# Patient Record
Sex: MALE | Race: WHITE | NOT HISPANIC OR LATINO | Employment: FULL TIME | URBAN - METROPOLITAN AREA
[De-identification: names, ages, dates, MRNs, and addresses within clinical notes are randomized per-mention and may not be internally consistent; named-entity substitution may affect disease eponyms.]

---

## 2017-06-23 ENCOUNTER — OFFICE VISIT (OUTPATIENT)
Dept: INTERNAL MEDICINE | Facility: CLINIC | Age: 67
End: 2017-06-23
Attending: INTERNAL MEDICINE
Payer: COMMERCIAL

## 2017-06-23 VITALS
DIASTOLIC BLOOD PRESSURE: 68 MMHG | SYSTOLIC BLOOD PRESSURE: 112 MMHG | WEIGHT: 218.69 LBS | HEART RATE: 74 BPM | BODY MASS INDEX: 28.07 KG/M2 | HEIGHT: 74 IN | OXYGEN SATURATION: 97 %

## 2017-06-23 DIAGNOSIS — I48.91 ATRIAL FIBRILLATION, UNSPECIFIED TYPE: Primary | ICD-10-CM

## 2017-06-23 DIAGNOSIS — T45.1X5A CHEMOTHERAPY-INDUCED NEUROPATHY: ICD-10-CM

## 2017-06-23 DIAGNOSIS — G47.00 INSOMNIA, UNSPECIFIED TYPE: ICD-10-CM

## 2017-06-23 DIAGNOSIS — G62.0 CHEMOTHERAPY-INDUCED NEUROPATHY: ICD-10-CM

## 2017-06-23 DIAGNOSIS — Z87.898 HISTORY OF ELEVATED PSA: ICD-10-CM

## 2017-06-23 DIAGNOSIS — N40.0 BENIGN PROSTATIC HYPERPLASIA, PRESENCE OF LOWER URINARY TRACT SYMPTOMS UNSPECIFIED, UNSPECIFIED MORPHOLOGY: ICD-10-CM

## 2017-06-23 DIAGNOSIS — C34.90 MALIGNANT NEOPLASM OF LUNG, UNSPECIFIED LATERALITY, UNSPECIFIED PART OF LUNG: ICD-10-CM

## 2017-06-23 DIAGNOSIS — G47.33 OSA (OBSTRUCTIVE SLEEP APNEA): ICD-10-CM

## 2017-06-23 DIAGNOSIS — Z00.00 ANNUAL PHYSICAL EXAM: ICD-10-CM

## 2017-06-23 PROCEDURE — 90471 IMMUNIZATION ADMIN: CPT | Mod: S$GLB,,, | Performed by: INTERNAL MEDICINE

## 2017-06-23 PROCEDURE — 99387 INIT PM E/M NEW PAT 65+ YRS: CPT | Mod: 25,S$GLB,, | Performed by: INTERNAL MEDICINE

## 2017-06-23 PROCEDURE — 99999 PR PBB SHADOW E&M-NEW PATIENT-LVL III: CPT | Mod: PBBFAC,,, | Performed by: INTERNAL MEDICINE

## 2017-06-23 PROCEDURE — 90715 TDAP VACCINE 7 YRS/> IM: CPT | Mod: S$GLB,,, | Performed by: INTERNAL MEDICINE

## 2017-06-23 RX ORDER — DEXAMETHASONE 4 MG/1
4 TABLET ORAL EVERY 12 HOURS
COMMUNITY

## 2017-06-23 RX ORDER — ONDANSETRON HYDROCHLORIDE 8 MG/1
TABLET, FILM COATED ORAL EVERY 8 HOURS PRN
COMMUNITY

## 2017-06-23 RX ORDER — FAMOTIDINE 40 MG/1
40 TABLET, FILM COATED ORAL DAILY
COMMUNITY

## 2017-06-23 RX ORDER — ROSUVASTATIN CALCIUM 10 MG/1
10 TABLET, COATED ORAL DAILY
COMMUNITY
End: 2017-06-23 | Stop reason: SDUPTHER

## 2017-06-23 RX ORDER — ROSUVASTATIN CALCIUM 10 MG/1
10 TABLET, COATED ORAL DAILY
Qty: 90 TABLET | Refills: 2 | Status: SHIPPED | OUTPATIENT
Start: 2017-06-23 | End: 2017-07-10 | Stop reason: SDUPTHER

## 2017-06-23 RX ORDER — CLINDAMYCIN PHOSPHATE 11.9 MG/ML
SOLUTION TOPICAL 2 TIMES DAILY
COMMUNITY

## 2017-06-23 RX ORDER — ALPRAZOLAM 0.5 MG/1
0.5 TABLET ORAL 3 TIMES DAILY
COMMUNITY

## 2017-06-23 RX ORDER — TAMSULOSIN HYDROCHLORIDE 0.4 MG/1
0.4 CAPSULE ORAL DAILY
Qty: 90 CAPSULE | Refills: 2 | Status: SHIPPED | OUTPATIENT
Start: 2017-06-23

## 2017-06-23 RX ORDER — ESCITALOPRAM OXALATE 10 MG/1
10 TABLET ORAL DAILY
COMMUNITY

## 2017-06-23 RX ORDER — FLUTICASONE PROPIONATE 50 MCG
1 SPRAY, SUSPENSION (ML) NASAL DAILY
COMMUNITY

## 2017-06-23 RX ORDER — METOPROLOL SUCCINATE 25 MG/1
25 TABLET, EXTENDED RELEASE ORAL DAILY
COMMUNITY

## 2017-06-23 RX ORDER — SODIUM FLUORIDE 5 MG/G
GEL, DENTIFRICE DENTAL
COMMUNITY

## 2017-06-23 RX ORDER — TRAZODONE HYDROCHLORIDE 50 MG/1
50-100 TABLET ORAL NIGHTLY
Qty: 90 TABLET | Refills: 1 | Status: SHIPPED | OUTPATIENT
Start: 2017-06-23 | End: 2017-09-23 | Stop reason: SDUPTHER

## 2017-06-23 RX ORDER — TAMSULOSIN HYDROCHLORIDE 0.4 MG/1
0.4 CAPSULE ORAL DAILY
COMMUNITY
End: 2017-06-23 | Stop reason: SDUPTHER

## 2017-06-23 RX ORDER — MINERAL OIL
180 ENEMA (ML) RECTAL DAILY
COMMUNITY

## 2017-06-23 RX ORDER — PROCHLORPERAZINE MALEATE 10 MG
10 TABLET ORAL EVERY 6 HOURS PRN
COMMUNITY

## 2017-06-23 RX ORDER — DUTASTERIDE 0.5 MG/1
0.5 CAPSULE, LIQUID FILLED ORAL DAILY
Qty: 90 CAPSULE | Refills: 2 | Status: SHIPPED | OUTPATIENT
Start: 2017-06-23 | End: 2018-06-23

## 2017-06-23 RX ORDER — TRAMADOL HYDROCHLORIDE 50 MG/1
50 TABLET ORAL EVERY 6 HOURS PRN
COMMUNITY

## 2017-06-23 NOTE — PROGRESS NOTES
Subjective:       Patient ID: Merritt Tsang is a 67 y.o. male.    Chief Complaint: Establish Care    Here to establish care  Moved to [place] several months ago to be closer to son and grandchildren.      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 Currently receiving chemo at [facility] that began 03/2017. Tumor has reportedly shrunk by 50%. He is tolerating chemo well and denies frequent infection, fatigue, CP, SOB, hemoptysis, LANDERS, PND, orthopnea, palpitations. He is remaining positive. He was started on lexpro and xanax at time of diagnosis. States he is in a good place right now. He does require xanax nightly for insomnia. Has occasional leg pain and numbness. Neuropathy is not yet painful. He reports gaining weight  Lung Cancer diagnosed Nov 8th tongue cancer related to HPV s/p chemo and radiation. Spleen involvment. Started chemo in March at [facility]. Tumor has shrunk byt 50%. Leg pain and numbness, gaining weight. Ct scan i  3 weeks. Luis Armando Martinez.    BPH  -Symptoms controlled on flomax and dutasteride. He reports several elevated PSA in the past years resulting in 3 negative biopsies with last being in [place] about 4--5 months ago. Most recent PSA was <2.     Atrial fibrillation  -asymptomatic since being placed on metoprolol. Denies ever being on anticoagulation. NSR today.     ANNIA  Adherent with CPAP and tolerating well. Denies fatigue, frequent HA, PND ,LANDERS, LE edema.    Former smoker and had AAA screening last year with mild dilation.               Review of Systems   Constitutional: Negative for appetite change, chills, fever and unexpected weight change.   HENT: Negative for hearing loss, sore throat and trouble swallowing.    Eyes: Negative for visual disturbance.   Respiratory: Negative for cough, chest tightness and shortness of breath.    Cardiovascular: Negative for chest pain and leg swelling.  "  Gastrointestinal: Negative for abdominal pain, blood in stool, constipation, diarrhea, nausea and vomiting.   Endocrine: Negative for polydipsia and polyuria.   Genitourinary: Negative for decreased urine volume, difficulty urinating, dysuria, frequency and urgency.   Musculoskeletal: Negative for gait problem.   Skin: Negative for rash.   Neurological: Positive for numbness. Negative for dizziness.   Psychiatric/Behavioral: Positive for sleep disturbance. The patient is not nervous/anxious.        History reviewed. No pertinent past medical history.  History reviewed. No pertinent surgical history.  History reviewed. No pertinent family history.  Social History     Social History    Marital status:      Spouse name: N/A    Number of children: N/A    Years of education: N/A     Occupational History    Not on file.     Social History Main Topics    Smoking status: Former Smoker    Smokeless tobacco: Not on file    Alcohol use Yes    Drug use: No    Sexual activity: Yes     Other Topics Concern    Not on file     Social History Narrative    No narrative on file         Objective:      Vitals:    06/23/17 0851   BP: 112/68   Pulse: 74   SpO2: 97%   Weight: 99.2 kg (218 lb 11.1 oz)   Height: 6' 2" (1.88 m)      Physical Exam   Constitutional: He is oriented to person, place, and time. He appears well-developed and well-nourished. No distress.   HENT:   Head: Normocephalic and atraumatic.   Mouth/Throat: Oropharynx is clear and moist. No oropharyngeal exudate.   Eyes: Conjunctivae and EOM are normal. Pupils are equal, round, and reactive to light. No scleral icterus.   Neck: No thyromegaly present.   Cardiovascular: Normal rate, regular rhythm and normal heart sounds.    No murmur heard.  Pulmonary/Chest: Effort normal and breath sounds normal. He has no wheezes. He has no rales.   Abdominal: Soft. He exhibits no distension. There is no tenderness.   Musculoskeletal: He exhibits no edema or " tenderness.   Lymphadenopathy:     He has no cervical adenopathy.   Neurological: He is alert and oriented to person, place, and time.   Skin: Skin is warm and dry.   Psychiatric: He has a normal mood and affect. His behavior is normal.       Assessment:       1. Atrial fibrillation, unspecified type    2. Annual physical exam    3. Benign prostatic hyperplasia, presence of lower urinary tract symptoms unspecified, unspecified morphology    4. History of elevated PSA    5. ANNIA (obstructive sleep apnea)    6. Malignant neoplasm of lung, unspecified laterality, unspecified part of lung    7. Chemotherapy-induced neuropathy    8. Insomnia, unspecified type        Plan:       Merritt was seen today for establish care.    Diagnoses and all orders for this visit:    Atrial fibrillation, unspecified type  -     Ambulatory referral to Cardiology    Annual physical exam  -     Lipid panel; Future  -     TSH; Future  -     Hemoglobin A1c; Future  -     PSA, Screening; Future  -     Comprehensive metabolic panel; Future    Benign prostatic hyperplasia, presence of lower urinary tract symptoms unspecified, unspecified morphology  -     dutasteride (AVODART) 0.5 mg capsule; Take 1 capsule (0.5 mg total) by mouth once daily.  -     tamsulosin (FLOMAX) 0.4 mg Cp24; Take 1 capsule (0.4 mg total) by mouth once daily.    History of elevated PSA  Comments:  s/p Bx x3 in [place]    ANNIA (obstructive sleep apnea)  -no acute issues. Sleep referral prn    Malignant neoplasm of lung, unspecified laterality, unspecified part of lung  F[facility]ology [facility]    Chemotherapy-induced neuropathy  Non painful. No gait disturbance or falls. No acute issues monitor    Insomnia, unspecified type  -     START trazodone (DESYREL) 50 MG tablet; Take 1-2 tablets ( mg total) by mouth every evening.      Other orders  -     Tdap Vaccine  -     rosuvastatin (CRESTOR) 10 MG tablet; Take 1 tablet (10 mg total) by mouth once daily.          Health Maintenance and Vaccinations:  Tdap up to date: no.  PVX up to date: yes 7/15/16  Colonoscopy: Up to date. 7/12/13 highly coveted 10 year f/u  PSA Screening: Needs, risk and benefits discussed  AAA Screening: Done     Notification of Lab Results: [facility]  Side effects of medication(s) were discussed in detail and patient voiced understanding.  Patient will call back for any issues or complications.

## 2017-06-23 NOTE — PROGRESS NOTES
Patient given TDAP IM in the LD. Patient tolerated well and Band-Aid was applied. Lot#[id number] Exp:04/05/2019. Patient advised to wait in the lobby for 15 min to make sure no adverse reactions occur. Patient given VIS information sheet.Patient states verbal understanding and has no further questions.

## 2017-06-26 ENCOUNTER — LAB VISIT (OUTPATIENT)
Dept: LAB | Facility: OTHER | Age: 67
End: 2017-06-26
Attending: INTERNAL MEDICINE
Payer: COMMERCIAL

## 2017-06-26 DIAGNOSIS — Z00.00 ANNUAL PHYSICAL EXAM: ICD-10-CM

## 2017-06-26 LAB
ALBUMIN SERPL BCP-MCNC: 3.5 G/DL
ALP SERPL-CCNC: 112 U/L
ALT SERPL W/O P-5'-P-CCNC: 28 U/L
ANION GAP SERPL CALC-SCNC: 10 MMOL/L
AST SERPL-CCNC: 17 U/L
BILIRUB SERPL-MCNC: 0.5 MG/DL
BUN SERPL-MCNC: 16 MG/DL
CALCIUM SERPL-MCNC: 8.6 MG/DL
CHLORIDE SERPL-SCNC: 106 MMOL/L
CHOLEST/HDLC SERPL: 3 {RATIO}
CO2 SERPL-SCNC: 25 MMOL/L
COMPLEXED PSA SERPL-MCNC: 0.95 NG/ML
CREAT SERPL-MCNC: 0.9 MG/DL
EST. GFR  (AFRICAN AMERICAN): >60 ML/MIN/1.73 M^2
EST. GFR  (NON AFRICAN AMERICAN): >60 ML/MIN/1.73 M^2
ESTIMATED AVG GLUCOSE: 105 MG/DL
GLUCOSE SERPL-MCNC: 78 MG/DL
HBA1C MFR BLD HPLC: 5.3 %
HDL/CHOLESTEROL RATIO: 32.8 %
HDLC SERPL-MCNC: 128 MG/DL
HDLC SERPL-MCNC: 42 MG/DL
LDLC SERPL CALC-MCNC: 71.8 MG/DL
NONHDLC SERPL-MCNC: 86 MG/DL
POTASSIUM SERPL-SCNC: 3.8 MMOL/L
PROT SERPL-MCNC: 6.2 G/DL
SODIUM SERPL-SCNC: 141 MMOL/L
TRIGL SERPL-MCNC: 71 MG/DL
TSH SERPL DL<=0.005 MIU/L-ACNC: 2.33 UIU/ML

## 2017-06-26 PROCEDURE — 80053 COMPREHEN METABOLIC PANEL: CPT

## 2017-06-26 PROCEDURE — 84443 ASSAY THYROID STIM HORMONE: CPT

## 2017-06-26 PROCEDURE — 83036 HEMOGLOBIN GLYCOSYLATED A1C: CPT

## 2017-06-26 PROCEDURE — 36415 COLL VENOUS BLD VENIPUNCTURE: CPT

## 2017-06-26 PROCEDURE — 80061 LIPID PANEL: CPT

## 2017-06-26 PROCEDURE — 84153 ASSAY OF PSA TOTAL: CPT

## 2017-06-29 ENCOUNTER — OFFICE VISIT (OUTPATIENT)
Dept: CARDIOLOGY | Facility: CLINIC | Age: 67
End: 2017-06-29
Payer: COMMERCIAL

## 2017-06-29 VITALS
WEIGHT: 220.44 LBS | SYSTOLIC BLOOD PRESSURE: 121 MMHG | DIASTOLIC BLOOD PRESSURE: 75 MMHG | HEIGHT: 74 IN | HEART RATE: 79 BPM | BODY MASS INDEX: 28.29 KG/M2

## 2017-06-29 DIAGNOSIS — G47.33 OSA (OBSTRUCTIVE SLEEP APNEA): ICD-10-CM

## 2017-06-29 DIAGNOSIS — C34.90 MALIGNANT NEOPLASM OF LUNG, UNSPECIFIED LATERALITY, UNSPECIFIED PART OF LUNG: ICD-10-CM

## 2017-06-29 DIAGNOSIS — I48.0 PAROXYSMAL ATRIAL FIBRILLATION: Primary | ICD-10-CM

## 2017-06-29 PROCEDURE — 99999 PR PBB SHADOW E&M-EST. PATIENT-LVL III: CPT | Mod: PBBFAC,,, | Performed by: INTERNAL MEDICINE

## 2017-06-29 PROCEDURE — 1126F AMNT PAIN NOTED NONE PRSNT: CPT | Mod: S$GLB,,, | Performed by: INTERNAL MEDICINE

## 2017-06-29 PROCEDURE — 99204 OFFICE O/P NEW MOD 45 MIN: CPT | Mod: S$GLB,,, | Performed by: INTERNAL MEDICINE

## 2017-06-29 PROCEDURE — 1159F MED LIST DOCD IN RCRD: CPT | Mod: S$GLB,,, | Performed by: INTERNAL MEDICINE

## 2017-06-29 NOTE — PROGRESS NOTES
Subjective:   Patient ID:  Merritt Tsang is a 67 y.o. male who presents for evaluation of Atrial Fibrillation and Establish Care      HPI: Very pleasant gentleman with a history of squamous-cell carcinoma of the tongue (HPV-associated) treated with chemo and radiation that then recurred in the lung is here for assessment of his apparently rare paroxysmal atrial fibrillation.  He's never been cardioverted and was dx'd approximately 15 years ago.  Last time he recalls being in AF was over 2 years ago.  He was dx'd with ANNIA less than a year ago and feels much better with CPAP.  He's now just finished a chemo regimen for the metastatic SCC and he's tolerated well and the tumors have responded well.    He denies chest discomfort, LANDERS, palpitations, PND/orthopnea, lightheadedness and syncope.    CHADSVASC is only 1, and that's from his being 67.    Patient Active Problem List   Diagnosis    History of elevated PSA    Benign prostatic hyperplasia    Malignant neoplasm of lung    ANNIA (obstructive sleep apnea)    Atrial fibrillation    Chemotherapy-induced neuropathy         Social History   Substance Use Topics    Smoking status: Former Smoker    Smokeless tobacco: Not on file    Alcohol use Yes       History reviewed. No pertinent family history.    Current Outpatient Prescriptions   Medication Sig    alprazolam (XANAX) 0.5 MG tablet Take 0.5 mg by mouth 3 (three) times daily.    clindamycin (CLEOCIN T) 1 % external solution Apply topically 2 (two) times daily.    dexamethasone (DECADRON) 4 MG Tab Take 4 mg by mouth every 12 (twelve) hours.    dutasteride (AVODART) 0.5 mg capsule Take 1 capsule (0.5 mg total) by mouth once daily.    escitalopram oxalate (LEXAPRO) 10 MG tablet Take 10 mg by mouth once daily.    famotidine (PEPCID) 40 MG tablet Take 40 mg by mouth once daily.    fexofenadine (ALLEGRA) 180 MG tablet Take 180 mg by mouth once daily.    fluticasone (FLONASE) 50 mcg/actuation nasal spray 1  spray by Each Nare route once daily.    metoprolol succinate (TOPROL-XL) 25 MG 24 hr tablet Take 25 mg by mouth once daily.    ondansetron (ZOFRAN) 8 MG tablet Take by mouth every 8 (eight) hours as needed for Nausea.    prochlorperazine (COMPAZINE) 10 MG tablet Take 10 mg by mouth every 6 (six) hours as needed.    rosuvastatin (CRESTOR) 10 MG tablet Take 1 tablet (10 mg total) by mouth once daily.    sodium fluoride (DENTAGEL) 1.1 % Gel Place onto teeth.    tamsulosin (FLOMAX) 0.4 mg Cp24 Take 1 capsule (0.4 mg total) by mouth once daily.    tramadol (ULTRAM) 50 mg tablet Take 50 mg by mouth every 6 (six) hours as needed for Pain.    trazodone (DESYREL) 50 MG tablet Take 1-2 tablets ( mg total) by mouth every evening.     No current facility-administered medications for this visit.        Review of patient's allergies indicates:  No Known Allergies    Review of Systems   Constitution: Negative.   HENT: Negative.    Eyes: Negative.    Cardiovascular: Negative.  Negative for chest pain, dyspnea on exertion, near-syncope, orthopnea and palpitations.   Respiratory: Negative.  Negative for cough, hemoptysis and shortness of breath.    Endocrine: Negative.    Hematologic/Lymphatic: Negative.    Skin: Negative.    Musculoskeletal: Negative.    Gastrointestinal: Negative.    Genitourinary: Negative.    Neurological: Negative.    Psychiatric/Behavioral: Negative.      Objective:   Physical Exam   Constitutional: He is oriented to person, place, and time. He appears well-developed and well-nourished.   HENT:   Head: Normocephalic and atraumatic.   Mouth/Throat: Oropharynx is clear and moist.   Eyes: Conjunctivae and EOM are normal. No scleral icterus.   Neck: Normal range of motion. Neck supple. No JVD present.   Cardiovascular: Normal rate, regular rhythm, normal heart sounds and intact distal pulses.  Exam reveals no gallop and no friction rub.    No murmur heard.  Pulmonary/Chest: Effort normal and breath  sounds normal. He has no wheezes. He has no rales.   Abdominal: Soft. Bowel sounds are normal. He exhibits no distension. There is no tenderness.   Musculoskeletal: Normal range of motion. He exhibits no edema.   Neurological: He is alert and oriented to person, place, and time.   Skin: Skin is warm and dry. No rash noted. No erythema.   Psychiatric: He has a normal mood and affect. His behavior is normal. Judgment and thought content normal.   Vitals reviewed.      No results found for: WBC, HGB, HCT, MCV, PLT      Chemistry        Component Value Date/Time     06/26/2017 0713    K 3.8 06/26/2017 0713     06/26/2017 0713    CO2 25 06/26/2017 0713    BUN 16 06/26/2017 0713    CREATININE 0.9 06/26/2017 0713    GLU 78 06/26/2017 0713        Component Value Date/Time    CALCIUM 8.6 (L) 06/26/2017 0713    ALKPHOS 112 06/26/2017 0713    AST 17 06/26/2017 0713    ALT 28 06/26/2017 0713    BILITOT 0.5 06/26/2017 0713    ESTGFRAFRICA >60 06/26/2017 0713    EGFRNONAA >60 06/26/2017 0713            Lab Results   Component Value Date    CHOL 128 06/26/2017     Lab Results   Component Value Date    HDL 42 06/26/2017     Lab Results   Component Value Date    LDLCALC 71.8 06/26/2017     Lab Results   Component Value Date    TRIG 71 06/26/2017     Lab Results   Component Value Date    CHOLHDL 32.8 06/26/2017       Lab Results   Component Value Date    TSH 2.331 06/26/2017       Lab Results   Component Value Date    HGBA1C 5.3 06/26/2017         Assessment:     1. Paroxysmal atrial fibrillation    2. ANNIA (obstructive sleep apnea)    3. Malignant neoplasm of lung, unspecified laterality, unspecified part of lung        Plan:     Continue current medicines and CPAP.      Diet/exercise goals reinforced.  Regular exercise strongly encouraged.    F/U PRN

## 2017-06-29 NOTE — LETTER
June 29, 2017      Colin Turner MD  [address]  [address]  [facility] [address]           [facility] - Cardiology  [address]  [place] [address]  Phone: [phone]          Patient: Merritt Tsang   MR Number: [id number]   YOB: 1950   Date of Visit: 6/29/2017       Dear Dr. Colin Turner:    Thank you for referring Merritt Tsang to me for evaluation. Attached you will find relevant portions of my assessment and plan of care.    If you have questions, please do not hesitate to call me. I look forward to following Merritt Tsang along with you.    Sincerely,    Bruce White MD    Enclosure  CC:  No Recipients    If you would like to receive this communication electronically, please contact [email or web address] or [phone] to request more information on [employer] access.    For providers and/or their staff who would like to refer a patient to [facility], please contact us through our one-stop-shop provider referral line, [facility], at [phone].    If you feel you have received this communication in error or would no longer like to receive these types of communications, please e-mail [email or web address]          T [phone]

## 2017-07-10 DIAGNOSIS — Z12.11 COLON CANCER SCREENING: ICD-10-CM

## 2017-07-10 RX ORDER — ROSUVASTATIN CALCIUM 10 MG/1
10 TABLET, COATED ORAL DAILY
Qty: 90 TABLET | Refills: 2 | Status: SHIPPED | OUTPATIENT
Start: 2017-07-10 | End: 2017-07-10 | Stop reason: SDUPTHER

## 2017-07-10 RX ORDER — ROSUVASTATIN CALCIUM 10 MG/1
10 TABLET, COATED ORAL DAILY
Qty: 90 TABLET | Refills: 2 | Status: SHIPPED | OUTPATIENT
Start: 2017-07-10

## 2017-07-10 NOTE — TELEPHONE ENCOUNTER
----- Message from Jana Richards sent at 7/7/2017  4:19 PM CDT -----  Contact: Self  X   1st Request  _  2nd Request  _  3rd Request        Who: ARIK HOLT [[id number]]    Why: Pt states he misplaced the rosuvastatin (CRESTOR) 10 MG tablet and a refill is too soon. Pt needs a new prescription sent to [facility] at [phone] fax is [phone]. Please call pt to further discuss, thanks!    What Number to Call Back: [phone]    When to Expect a call back: (With in 24 hours)

## 2017-09-24 RX ORDER — TRAZODONE HYDROCHLORIDE 50 MG/1
50-100 TABLET ORAL NIGHTLY
Qty: 90 TABLET | Refills: 1 | Status: SHIPPED | OUTPATIENT
Start: 2017-09-24 | End: 2018-09-24

## 2018-05-25 DIAGNOSIS — Z12.11 COLON CANCER SCREENING: ICD-10-CM

## 2018-06-08 DIAGNOSIS — Z11.59 NEED FOR HEPATITIS C SCREENING TEST: ICD-10-CM

## 2019-03-15 ENCOUNTER — PATIENT OUTREACH (OUTPATIENT)
Dept: ADMINISTRATIVE | Facility: HOSPITAL | Age: 69
End: 2019-03-15

## 2019-03-15 NOTE — PROGRESS NOTES
[facility].  Our records indicate that you are due for an annual checkup with your primary care provider,  Dr. Colin Turner MD.  Please call [phone] to schedule a routine physical exam.  You can also schedule your appointment via your [facility] anika. You may also be due for the following test and/or procedures:    Health Maintenance Due   Topic Date Due    Hepatitis C Screening  1950    Colonoscopy  06/10/2001    Zoster Vaccine  06/10/2011    Pneumococcal Vaccine (65+ High/Highest Risk) (1 of 2 - PCV13) 06/10/2016    Abdominal Aortic Aneurysm Screening  06/10/2016    Influenza Vaccine  08/01/2018       If you have chosen another Primary Care Physician please contact us so that your medical records can be updated.         If you have had any of the above done at another facility, please let us know by calling [phone]; so that we can update your record.  We will add these results to your chart if you fax them to the fax number listed below.  If you have any questions, please call [phone].    Thanks,       Additional Information  If you have questions, you can email [email or web address] or call [phone]  to talk to our [facility] staff. Remember, [facility] is NOT to be used for urgent needs. For medical emergencies, dial 911.

## 2019-05-31 DIAGNOSIS — Z12.11 COLON CANCER SCREENING: ICD-10-CM

## 2019-05-31 DIAGNOSIS — Z13.6 SCREENING FOR AAA (AORTIC ABDOMINAL ANEURYSM): ICD-10-CM

## 2021-04-26 ENCOUNTER — PATIENT MESSAGE (OUTPATIENT)
Dept: RESEARCH | Facility: HOSPITAL | Age: 71
End: 2021-04-26